# Patient Record
Sex: MALE | Race: WHITE | URBAN - METROPOLITAN AREA
[De-identification: names, ages, dates, MRNs, and addresses within clinical notes are randomized per-mention and may not be internally consistent; named-entity substitution may affect disease eponyms.]

---

## 2022-12-28 ENCOUNTER — OFFICE VISIT (OUTPATIENT)
Dept: FAMILY MEDICINE CLINIC | Age: 22
End: 2022-12-28
Payer: COMMERCIAL

## 2022-12-28 VITALS
OXYGEN SATURATION: 98 % | RESPIRATION RATE: 16 BRPM | SYSTOLIC BLOOD PRESSURE: 110 MMHG | HEART RATE: 62 BPM | HEIGHT: 71 IN | DIASTOLIC BLOOD PRESSURE: 66 MMHG | WEIGHT: 176 LBS | BODY MASS INDEX: 24.64 KG/M2

## 2022-12-28 DIAGNOSIS — R86.9 ABNORMAL SEMEN ANALYSIS: Primary | ICD-10-CM

## 2022-12-28 PROCEDURE — G8420 CALC BMI NORM PARAMETERS: HCPCS | Performed by: FAMILY MEDICINE

## 2022-12-28 PROCEDURE — G8427 DOCREV CUR MEDS BY ELIG CLIN: HCPCS | Performed by: FAMILY MEDICINE

## 2022-12-28 PROCEDURE — G8484 FLU IMMUNIZE NO ADMIN: HCPCS | Performed by: FAMILY MEDICINE

## 2022-12-28 PROCEDURE — 99204 OFFICE O/P NEW MOD 45 MIN: CPT | Performed by: FAMILY MEDICINE

## 2022-12-28 PROCEDURE — 4004F PT TOBACCO SCREEN RCVD TLK: CPT | Performed by: FAMILY MEDICINE

## 2022-12-28 SDOH — ECONOMIC STABILITY: FOOD INSECURITY: WITHIN THE PAST 12 MONTHS, YOU WORRIED THAT YOUR FOOD WOULD RUN OUT BEFORE YOU GOT MONEY TO BUY MORE.: NEVER TRUE

## 2022-12-28 SDOH — ECONOMIC STABILITY: FOOD INSECURITY: WITHIN THE PAST 12 MONTHS, THE FOOD YOU BOUGHT JUST DIDN'T LAST AND YOU DIDN'T HAVE MONEY TO GET MORE.: NEVER TRUE

## 2022-12-28 ASSESSMENT — PATIENT HEALTH QUESTIONNAIRE - PHQ9
SUM OF ALL RESPONSES TO PHQ QUESTIONS 1-9: 0
SUM OF ALL RESPONSES TO PHQ9 QUESTIONS 1 & 2: 0
SUM OF ALL RESPONSES TO PHQ QUESTIONS 1-9: 0
SUM OF ALL RESPONSES TO PHQ QUESTIONS 1-9: 0
1. LITTLE INTEREST OR PLEASURE IN DOING THINGS: 0
2. FEELING DOWN, DEPRESSED OR HOPELESS: 0
SUM OF ALL RESPONSES TO PHQ QUESTIONS 1-9: 0

## 2022-12-28 ASSESSMENT — SOCIAL DETERMINANTS OF HEALTH (SDOH): HOW HARD IS IT FOR YOU TO PAY FOR THE VERY BASICS LIKE FOOD, HOUSING, MEDICAL CARE, AND HEATING?: NOT HARD AT ALL

## 2022-12-28 NOTE — PROGRESS NOTES
12/28/2022    Donnise Opitz is a 23 y.o. male     HPI    Here with his wife Josie    Trying to conceive for the past 14 months  Wife, Josie went to her OB for eval. She is seeing a fertility specialist, Clary Howard for further workup  Went to [facility] and attempted 5 rounds of IUI    Would like to look into other potential causes of potential infertility    Past medical hx is notable for type I diabetes. It is well controlled    Eats a vegan diet. Limits processed food    Hx of undescended testicle, surgery when an infant    No prolonged times riding bikes, hot tub, etc    Takes some OTC supplements    Notes he had higher work related stress over the past year that is now improving. It was mostly the volume of the workload. Now things are a bit more manageable. Review of Systems    Prior to Visit Medications    Medication Sig Taking? Authorizing Provider   Insulin Lispro (HUMALOG IJ) Inject 50 Units as directed daily Yes Historical Provider, MD     No past medical history on file. No past surgical history on file. No family history on file. Social History     Socioeconomic History    Marital status:    Tobacco Use    Smoking status: Former     Types: Cigarettes     Social Determinants of Health     Financial Resource Strain: Low Risk     Difficulty of Paying Living Expenses: Not hard at all   Food Insecurity: No Food Insecurity    Worried About [address] in the Last Year: Never true    [facility] in the Last Year: Never true     No Known Allergies    PHYSICAL EXAM  /66 (Site: Left Upper Arm, Position: Sitting, Cuff Size: Large Adult)   Pulse 62   Resp 16   Ht 5' 11\" (1.803 m)   Wt 176 lb (79.8 kg)   SpO2 98%   BMI 24.55 kg/m²     BP Readings from Last 3 Encounters:   12/28/22 110/66       Wt Readings from Last 3 Encounters:   12/28/22 176 lb (79.8 kg)        Physical Exam    ASSESSMENT/PLAN:  1. Abnormal semen analysis  - Prolactin; Future  - TSH with Reflex;  Future  - Estradiol; Future  - Follicle Stimulating Hormone; Future  - Testosterone, free, total; Future  - Luteinizing Hormone; Future  - Cortisol AM, Total; Future    April Hopkins and his wife Mark Michelle have been trying to conceive for the past 17 months. She is seeing a fertility specialist.    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 Chart review shows that his sperm analyses at times will show sperm concentration and motility below the reference range, but not always (typically they are borderline)  - Sperm analysis available for review from  notable for 4% normal morphology (considered low end of subfertile by Harvinder Chandler criteria) and 96% head defects. He overall leads a healthy lifestyle. We discussed checking hormones as noted above. We reviewed that in cases of certain findings, there are well-established treatments (clomid, femara, etc) if indicated. We also discussed that improving sperm parameters otherwise can fall into less evidence-based and even expert opinion / anecdotal realms. Will check labs and review prior sperm analyses. Treatment advise will be based on review.  Discussed for anatomical concerns or sub-specialist opinion (they do not have any currently) would recommend Urology specialist.

## 2022-12-30 DIAGNOSIS — R86.9 ABNORMAL SEMEN ANALYSIS: ICD-10-CM

## 2022-12-30 LAB
CORTISOL - AM: 11.7 UG/DL (ref 4.3–22.4)
ESTRADIOL LEVEL: 25 PG/ML
FOLLICLE STIMULATING HORMONE: 5.7 MIU/ML
LUTEINIZING HORMONE: 6.4 MIU/ML
PROLACTIN: 16.6 NG/ML
TSH REFLEX: 2.19 UIU/ML (ref 0.27–4.2)

## 2023-01-04 ENCOUNTER — TELEPHONE (OUTPATIENT)
Dept: FAMILY MEDICINE CLINIC | Age: 23
End: 2023-01-04

## 2023-01-04 LAB
SEX HORMONE BINDING GLOBULIN: 56 NMOL/L (ref 11–80)
TESTOSTERONE FREE-NONMALE: 102.9 PG/ML (ref 47–244)
TESTOSTERONE TOTAL: 666 NG/DL (ref 220–1000)

## 2023-01-04 NOTE — TELEPHONE ENCOUNTER
This is being done.  They held orders for the holiday and it got sent out Friday and will be in by Friday